# Patient Record
Sex: FEMALE | Race: WHITE | NOT HISPANIC OR LATINO | URBAN - METROPOLITAN AREA
[De-identification: names, ages, dates, MRNs, and addresses within clinical notes are randomized per-mention and may not be internally consistent; named-entity substitution may affect disease eponyms.]

---

## 2018-08-08 ENCOUNTER — EMERGENCY (EMERGENCY)
Facility: HOSPITAL | Age: 83
LOS: 1 days | Discharge: HOME | End: 2018-08-08
Attending: EMERGENCY MEDICINE
Payer: MEDICARE

## 2018-08-08 VITALS
RESPIRATION RATE: 18 BRPM | TEMPERATURE: 99 F | WEIGHT: 119.93 LBS | SYSTOLIC BLOOD PRESSURE: 164 MMHG | HEIGHT: 62 IN | HEART RATE: 68 BPM | DIASTOLIC BLOOD PRESSURE: 83 MMHG | OXYGEN SATURATION: 96 %

## 2018-08-08 LAB
APPEARANCE UR: CLEAR — SIGNIFICANT CHANGE UP
BACTERIA # UR AUTO: ABNORMAL
BILIRUB UR-MCNC: NEGATIVE — SIGNIFICANT CHANGE UP
COLOR SPEC: YELLOW — SIGNIFICANT CHANGE UP
DIFF PNL FLD: ABNORMAL
EPI CELLS # UR: SIGNIFICANT CHANGE UP
GLUCOSE UR QL: NEGATIVE MG/DL — SIGNIFICANT CHANGE UP
KETONES UR-MCNC: NEGATIVE — SIGNIFICANT CHANGE UP
LEUKOCYTE ESTERASE UR-ACNC: ABNORMAL
NITRITE UR-MCNC: NEGATIVE — SIGNIFICANT CHANGE UP
PH UR: 6 — SIGNIFICANT CHANGE UP (ref 5–8)
PROT UR-MCNC: NEGATIVE MG/DL — SIGNIFICANT CHANGE UP
RBC CASTS # UR COMP ASSIST: SIGNIFICANT CHANGE UP /HPF (ref 0–4)
SP GR SPEC: 1.01 — SIGNIFICANT CHANGE UP (ref 1.01–1.02)
UROBILINOGEN FLD QL: NEGATIVE MG/DL — SIGNIFICANT CHANGE UP
WBC UR QL: ABNORMAL

## 2018-08-08 PROCEDURE — 81001 URINALYSIS AUTO W/SCOPE: CPT

## 2018-08-08 PROCEDURE — 82962 GLUCOSE BLOOD TEST: CPT

## 2018-08-08 PROCEDURE — 99284 EMERGENCY DEPT VISIT MOD MDM: CPT | Mod: 25

## 2018-08-08 PROCEDURE — 99284 EMERGENCY DEPT VISIT MOD MDM: CPT

## 2018-08-08 PROCEDURE — 87086 URINE CULTURE/COLONY COUNT: CPT

## 2018-08-08 PROCEDURE — 96372 THER/PROPH/DIAG INJ SC/IM: CPT

## 2018-08-08 PROCEDURE — 70450 CT HEAD/BRAIN W/O DYE: CPT

## 2018-08-08 PROCEDURE — 70450 CT HEAD/BRAIN W/O DYE: CPT | Mod: 26

## 2018-08-08 PROCEDURE — 87186 SC STD MICRODIL/AGAR DIL: CPT

## 2018-08-08 RX ORDER — CEPHALEXIN 500 MG
1 CAPSULE ORAL
Qty: 28 | Refills: 0 | OUTPATIENT
Start: 2018-08-08 | End: 2018-08-14

## 2018-08-08 RX ORDER — CEFTRIAXONE 500 MG/1
250 INJECTION, POWDER, FOR SOLUTION INTRAMUSCULAR; INTRAVENOUS ONCE
Qty: 0 | Refills: 0 | Status: COMPLETED | OUTPATIENT
Start: 2018-08-08 | End: 2018-08-08

## 2018-08-08 RX ADMIN — CEFTRIAXONE 250 MILLIGRAM(S): 500 INJECTION, POWDER, FOR SOLUTION INTRAMUSCULAR; INTRAVENOUS at 21:36

## 2018-08-08 NOTE — ED STATDOCS - PROGRESS NOTE DETAILS
89yo female with pmh of advanced dementia woke up around 1130am this morning more confused than normal with difficulty speaking, last normal was last night prior to going to sleep. Pt was this confused for about 3 hours, now back to baseline. NIH score 0 pt does not know the date but this is baseline as per daughter. Priority CT ordered, pt to be further evaluated in the main.

## 2018-08-08 NOTE — ED ADULT NURSE NOTE - NSIMPLEMENTINTERV_GEN_ALL_ED
Implemented All Fall with Harm Risk Interventions:  [place] to call system. Call bell, personal items and telephone within reach. Instruct patient to call for assistance. Room bathroom lighting operational. Non-slip footwear when patient is off stretcher. Physically safe environment: no spills, clutter or unnecessary equipment. Stretcher in lowest position, wheels locked, appropriate side rails in place. Provide visual cue, wrist band, yellow gown, etc. Monitor gait and stability. Monitor for mental status changes and reorient to person, place, and time. Review medications for side effects contributing to fall risk. Reinforce activity limits and safety measures with patient and family. Provide visual clues: red socks.

## 2018-08-08 NOTE — ED ADULT NURSE NOTE - OBJECTIVE STATEMENT
Per family, pt had episode of difficulty speaking. Pt A&Ox1 which is baseline. Denies pain at this time.

## 2018-08-08 NOTE — ED ADULT TRIAGE NOTE - CHIEF COMPLAINT QUOTE
pt presents to ED With episode of more confusion than normal and unable to speak, which family states is sometimes normal for her at 1130/1200 lasting one hour now resolved. pt a&ox1 which is pt's baseline as per family. pt has hx of dementia. no facial droop noted. pt ambulates with assistance. pt has decreased PO intake today. denies HA/slurred speech. ED attending called to triage for immediate evaluation.

## 2018-08-08 NOTE — ED PROVIDER NOTE - NS_ ATTENDINGSCRIBEDETAILS _ED_A_ED_FT
I, Nahid Irvin, performed the initial face to face bedside interview with this patient regarding history of present illness, review of symptoms and relevant past medical, social and family history.  I completed an independent physical examination.   The history, relevant review of systems, past medical and surgical history, medical decision making, and physical examination was documented by the scribe in my presence and I attest to the accuracy of the documentation.

## 2018-08-08 NOTE — ED ADULT NURSE NOTE - EENT WDL
- likely type 2 in the setting of acute respiratory failure and acute CHF  - troponin was noted to be 0 13 at highest, then trended down  - telemetry monitoring and EKGs remained stable  - continue aspirin, statin, and beta blocker  Eyes with no visual disturbances.  Ears clean and dry and no hearing difficulties. Nose with pink mucosa and no drainage.  Mouth mucous membranes moist and pink.  No tenderness or swelling to throat or neck.

## 2018-08-08 NOTE — ED PROVIDER NOTE - OBJECTIVE STATEMENT
89 y/o F pt with hx of dementia presents to ED brought in with  and daughter for AMS, disoriented, agitation and refusing to eat. Per daughter pt had Episode of unable to speech and transient episode unable to see, unable to move. They called pt's primary who advised pt to come ED for eval for dehydration. No vomiting, fevers, diarrhea, dysuria. Hx limited from pt secondary to hx of dementia   PMD: Dr. Varner

## 2019-10-30 PROBLEM — Z00.00 ENCOUNTER FOR PREVENTIVE HEALTH EXAMINATION: Status: ACTIVE | Noted: 2019-10-30

## 2019-11-05 ENCOUNTER — APPOINTMENT (OUTPATIENT)
Dept: ORTHOPEDIC SURGERY | Facility: CLINIC | Age: 84
End: 2019-11-05

## 2019-12-04 ENCOUNTER — APPOINTMENT (OUTPATIENT)
Dept: ORTHOPEDIC SURGERY | Facility: CLINIC | Age: 84
End: 2019-12-04
Payer: MEDICARE

## 2019-12-04 VITALS
BODY MASS INDEX: 25.76 KG/M2 | HEART RATE: 78 BPM | WEIGHT: 140 LBS | HEIGHT: 62 IN | SYSTOLIC BLOOD PRESSURE: 116 MMHG | DIASTOLIC BLOOD PRESSURE: 72 MMHG

## 2019-12-04 DIAGNOSIS — Z78.9 OTHER SPECIFIED HEALTH STATUS: ICD-10-CM

## 2019-12-04 DIAGNOSIS — Z87.39 PERSONAL HISTORY OF OTHER DISEASES OF THE MUSCULOSKELETAL SYSTEM AND CONNECTIVE TISSUE: ICD-10-CM

## 2019-12-04 DIAGNOSIS — M16.11 UNILATERAL PRIMARY OSTEOARTHRITIS, RIGHT HIP: ICD-10-CM

## 2019-12-04 PROCEDURE — 73502 X-RAY EXAM HIP UNI 2-3 VIEWS: CPT | Mod: RT

## 2019-12-04 PROCEDURE — 99203 OFFICE O/P NEW LOW 30 MIN: CPT

## 2019-12-04 RX ORDER — QUETIAPINE FUMARATE 25 MG/1
25 TABLET ORAL
Refills: 0 | Status: ACTIVE | COMMUNITY

## 2019-12-04 RX ORDER — CHROMIUM 200 MCG
TABLET ORAL
Refills: 0 | Status: ACTIVE | COMMUNITY

## 2019-12-04 RX ORDER — LIDOCAINE 5% 700 MG/1
5 PATCH TOPICAL
Refills: 0 | Status: ACTIVE | COMMUNITY

## 2019-12-04 RX ORDER — LEVOTHYROXINE SODIUM 0.03 MG/1
25 TABLET ORAL
Refills: 0 | Status: ACTIVE | COMMUNITY

## 2019-12-04 NOTE — DISCUSSION/SUMMARY
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 The patient is in severe pain but the family does not want to entertain surgery at this point due to advanced dementia. We will send her for right hip US-guided intraarticular cortisone injection. \par \par Total hip arthroplasty: A hip replacement means a resurfacing of both surfaces of the hip, with metal, ceramic and/or plastic parts. The prosthetic parts are usually press fit into bone, and only rarely cemented into position. Patients are allowed to weight bear as tolerated in most cases. The postoperative motion is determined by multiple factors the most important of which is preoperative motion. In general, the better the motion pre operatively, the better the motion post operatively. The operation, pending medical clearance, generally requires a hospitalization of 3-4 days. In general, we prefer to perform the procedure under epidural or general anesthesia. Preoperatively we institute a nomogram for blood management which may include the administration of procrit. The operative procedure takes approximately 1-2 hours. The operation requires an oblique incision anywhere from 4-6 inches over the posterolateral hip region. Post operatively the patient is walking the day of or the day after surgery. The first couple of days are very painful and the pain medication will alleviate but not eliminate the pain. Thus the patient must really push hard to get their mobility back. Our goal for having the person go home is that they can walk with a walker or a cane. The walking aide is to be dispensed with once the patient is secure enough. In general, there is no cast or braces required with a routine hip replacement. In the long term, we do not encourage our patients to run for the sake of running; although, pending their pre operative status, we often allow patients to play doubles tennis or comparable activities. We also allow them to do gentle intermediate downhill skiing if they are truly an expert skier. Biking is encouraged as well as swimming. The follow up periods are usually at 3 weeks, 6 weeks, 3 months, and yearly intervals. Potential complications with total hip replacements include anaesthetic complications and death, infection (less than 1%), nerve damage, and in the case of a sciatic nerve injury, a permanent foot drop, (a flapping foot with ambulation that requires bracing). There are always areas of skin numbness but this is not an untoward effect nor do we consider it a complication. Other potential complications include dislocation of the hip component (less than 5%). In cases of recurrent dislocation revision surgery may be required. The loosening of either the acetabular or femoral component is much more infrequent. The components may wear, creating particulate debris, loosening and systemic complications. Specific concerns exist with all bearing surfaces such as metal sensitivity with metal on metal components, and the risk of fracture and squeaking with ceramic components. Major blood vessel damage is also extremely rare. Theoretically because of the anatomic proximity of the femoral artery, it could be lacerated with subsequent repairs required. Albeit unlikely, a disruption of the femoral artery could theoretically result in an amputation. Similarly, infection could theoretically result in an amputation if one were to grow out an organism that cannot be controlled with antibiotics. Most infections require removal of the prosthesis, placement of an antibiotic spacer, IV antibiotics for eradication, prior to reimplantation. General medical complications include phlebitis for which we prophylactically anticoagulate but it could still occur and fatal pulmonary embolus has been reported. Cardiovascular problems, such as a heart attack or ischemia are always a concern with such hemodynamic changes in the blood vascular system. There is a risk of leg length discrepancy and the need for a shoe lift. Other general complications are very rare but anything in medicine could theoretically happen. I think the patient understands the risk benefit ratio of total hip replacement and will think about whether they would like to pursue an operative or non-operative option.  I reviewed the plan of care as well as a model of a total hip implant equivalent to the one that will be used for their total hip joint replacement. The patient agreed to the plan of care as well as the use of implants for their hip total joint replacement.\par

## 2019-12-04 NOTE — PHYSICAL EXAM
[Normal] : Gait: normal [LE] : Sensory: Intact in bilateral lower extremities [ALL] : dorsalis pedis, posterior tibial, femoral, popliteal, and radial 2+ and symmetric bilaterally [Antalgic] : not antalgic [de-identified] : Right hip exam shows marked stiffness of right hip, pain with ROM. [de-identified] : GENERAL APPEARANCE: Well nourished and hydrated, pleasant, Appears their stated age. Severe advanced dementia.\par HEENT: Normocephalic, extraocular eye motion intact. Nasal septum midline. Oral cavity clear. External auditory canal clear. \par RESPIRATORY: Breath sounds clear and audible in all lobes. No wheezing, No accessory muscle use.\par CARDIOVASCULAR: No apparent abnormalities. No lower leg edema. No varicosities. Pedal pulses are palpable.\par NEUROLOGIC: Sensation is normal, no muscle weakness in the upper or lower extremities.\par DERMATOLOGIC: No apparent skin lesions, moist, warm, no rash.\par SPINE: Cervical spine appears normal and moves freely; thoracic spine appears normal and moves freely; lumbosacral spine appears normal and moves freely, normal, nontender.\par MUSCULOSKELETAL: Hands, wrists, and elbows are normal and move freely, shoulders are normal and move freely.  [de-identified] : 3V Xray of the right hip and pelvis done in office today and reviewed by Dr. Juan Tom demonstrates bone-on-bone right hip osteoarthritis.

## 2019-12-04 NOTE — HISTORY OF PRESENT ILLNESS
[6] : a current pain level of 6/10 [Stable] : stable [Bending] : worsened by bending [Standing] : standing [Walking] : worsened by walking [de-identified] : 89 year old female here for evaluation of right hip pain. Patient with daughter, patient with hx of dementia.  patient has had chronic pain for over 10 years, has had injections in past by [facility].  patient had injections, last being 2 years ago. patient uses OTC patches for pain. Daughter states pain increasing 6/10 in severity, patient in PT/OT at assisted living.  patient having more difficulty standing up from seated positions. Pain is worse with walking, standing, and bending motions as per family. patient family looking for pain relief\par

## 2019-12-04 NOTE — ADDENDUM
[FreeTextEntry1] : I, Damon Alejandre, acted solely as a scribe for Dr. Juan Tom on this date 12/04/2019.

## 2019-12-22 ENCOUNTER — FORM ENCOUNTER (OUTPATIENT)
Age: 84
End: 2019-12-22

## 2019-12-23 ENCOUNTER — OUTPATIENT (OUTPATIENT)
Dept: OUTPATIENT SERVICES | Facility: HOSPITAL | Age: 84
LOS: 1 days | End: 2019-12-23

## 2019-12-23 ENCOUNTER — APPOINTMENT (OUTPATIENT)
Dept: ULTRASOUND IMAGING | Facility: CLINIC | Age: 84
End: 2019-12-23
Payer: MEDICARE

## 2019-12-23 DIAGNOSIS — M16.11 UNILATERAL PRIMARY OSTEOARTHRITIS, RIGHT HIP: ICD-10-CM

## 2019-12-23 PROCEDURE — 20611 DRAIN/INJ JOINT/BURSA W/US: CPT | Mod: RT

## 2019-12-30 ENCOUNTER — EMERGENCY (EMERGENCY)
Facility: HOSPITAL | Age: 84
LOS: 1 days | Discharge: HOME | End: 2019-12-30
Attending: EMERGENCY MEDICINE
Payer: MEDICARE

## 2019-12-30 VITALS
HEIGHT: 63 IN | OXYGEN SATURATION: 98 % | RESPIRATION RATE: 14 BRPM | DIASTOLIC BLOOD PRESSURE: 68 MMHG | SYSTOLIC BLOOD PRESSURE: 100 MMHG | TEMPERATURE: 98 F | WEIGHT: 110.01 LBS | HEART RATE: 111 BPM

## 2019-12-30 LAB
ALBUMIN SERPL ELPH-MCNC: 3.9 G/DL — SIGNIFICANT CHANGE UP (ref 3.3–5.2)
ALP SERPL-CCNC: 78 U/L — SIGNIFICANT CHANGE UP (ref 40–120)
ALT FLD-CCNC: 11 U/L — SIGNIFICANT CHANGE UP
ANION GAP SERPL CALC-SCNC: 12 MMOL/L — SIGNIFICANT CHANGE UP (ref 5–17)
APPEARANCE UR: CLEAR — SIGNIFICANT CHANGE UP
AST SERPL-CCNC: 15 U/L — SIGNIFICANT CHANGE UP
BACTERIA # UR AUTO: ABNORMAL
BILIRUB SERPL-MCNC: 0.2 MG/DL — LOW (ref 0.4–2)
BILIRUB UR-MCNC: NEGATIVE — SIGNIFICANT CHANGE UP
BUN SERPL-MCNC: 23 MG/DL — HIGH (ref 8–20)
CALCIUM SERPL-MCNC: 9.1 MG/DL — SIGNIFICANT CHANGE UP (ref 8.6–10.2)
CHLORIDE SERPL-SCNC: 99 MMOL/L — SIGNIFICANT CHANGE UP (ref 98–107)
CO2 SERPL-SCNC: 26 MMOL/L — SIGNIFICANT CHANGE UP (ref 22–29)
COLOR SPEC: YELLOW — SIGNIFICANT CHANGE UP
CREAT SERPL-MCNC: 0.84 MG/DL — SIGNIFICANT CHANGE UP (ref 0.5–1.3)
DIFF PNL FLD: ABNORMAL
EPI CELLS # UR: SIGNIFICANT CHANGE UP
GLUCOSE SERPL-MCNC: 81 MG/DL — SIGNIFICANT CHANGE UP (ref 70–115)
GLUCOSE UR QL: NEGATIVE — SIGNIFICANT CHANGE UP
HCT VFR BLD CALC: 37.7 % — SIGNIFICANT CHANGE UP (ref 34.5–45)
HGB BLD-MCNC: 12.3 G/DL — SIGNIFICANT CHANGE UP (ref 11.5–15.5)
KETONES UR-MCNC: NEGATIVE — SIGNIFICANT CHANGE UP
LEUKOCYTE ESTERASE UR-ACNC: NEGATIVE — SIGNIFICANT CHANGE UP
MAGNESIUM SERPL-MCNC: 2.2 MG/DL — SIGNIFICANT CHANGE UP (ref 1.6–2.6)
MCHC RBC-ENTMCNC: 30.4 PG — SIGNIFICANT CHANGE UP (ref 27–34)
MCHC RBC-ENTMCNC: 32.6 GM/DL — SIGNIFICANT CHANGE UP (ref 32–36)
MCV RBC AUTO: 93.1 FL — SIGNIFICANT CHANGE UP (ref 80–100)
NITRITE UR-MCNC: POSITIVE
PH UR: 6.5 — SIGNIFICANT CHANGE UP (ref 5–8)
PLATELET # BLD AUTO: 308 K/UL — SIGNIFICANT CHANGE UP (ref 150–400)
POTASSIUM SERPL-MCNC: 4.6 MMOL/L — SIGNIFICANT CHANGE UP (ref 3.5–5.3)
POTASSIUM SERPL-SCNC: 4.6 MMOL/L — SIGNIFICANT CHANGE UP (ref 3.5–5.3)
PROT SERPL-MCNC: 7.3 G/DL — SIGNIFICANT CHANGE UP (ref 6.6–8.7)
PROT UR-MCNC: NEGATIVE — SIGNIFICANT CHANGE UP
RBC # BLD: 4.05 M/UL — SIGNIFICANT CHANGE UP (ref 3.8–5.2)
RBC # FLD: 13.2 % — SIGNIFICANT CHANGE UP (ref 10.3–14.5)
RBC CASTS # UR COMP ASSIST: SIGNIFICANT CHANGE UP /HPF (ref 0–4)
SODIUM SERPL-SCNC: 137 MMOL/L — SIGNIFICANT CHANGE UP (ref 135–145)
SP GR SPEC: 1.01 — SIGNIFICANT CHANGE UP (ref 1.01–1.02)
UROBILINOGEN FLD QL: NEGATIVE — SIGNIFICANT CHANGE UP
WBC # BLD: 7.88 K/UL — SIGNIFICANT CHANGE UP (ref 3.8–10.5)
WBC # FLD AUTO: 7.88 K/UL — SIGNIFICANT CHANGE UP (ref 3.8–10.5)
WBC UR QL: SIGNIFICANT CHANGE UP

## 2019-12-30 PROCEDURE — 82962 GLUCOSE BLOOD TEST: CPT

## 2019-12-30 PROCEDURE — 93010 ELECTROCARDIOGRAM REPORT: CPT

## 2019-12-30 PROCEDURE — 99284 EMERGENCY DEPT VISIT MOD MDM: CPT | Mod: GC

## 2019-12-30 PROCEDURE — 36415 COLL VENOUS BLD VENIPUNCTURE: CPT

## 2019-12-30 PROCEDURE — 85027 COMPLETE CBC AUTOMATED: CPT

## 2019-12-30 PROCEDURE — 74019 RADEX ABDOMEN 2 VIEWS: CPT | Mod: 26

## 2019-12-30 PROCEDURE — 93005 ELECTROCARDIOGRAM TRACING: CPT

## 2019-12-30 PROCEDURE — 99284 EMERGENCY DEPT VISIT MOD MDM: CPT | Mod: 25

## 2019-12-30 PROCEDURE — 84484 ASSAY OF TROPONIN QUANT: CPT

## 2019-12-30 PROCEDURE — 70450 CT HEAD/BRAIN W/O DYE: CPT | Mod: 26

## 2019-12-30 PROCEDURE — 83735 ASSAY OF MAGNESIUM: CPT

## 2019-12-30 PROCEDURE — 74019 RADEX ABDOMEN 2 VIEWS: CPT

## 2019-12-30 PROCEDURE — 71046 X-RAY EXAM CHEST 2 VIEWS: CPT | Mod: 26

## 2019-12-30 PROCEDURE — 83690 ASSAY OF LIPASE: CPT

## 2019-12-30 PROCEDURE — 87086 URINE CULTURE/COLONY COUNT: CPT

## 2019-12-30 PROCEDURE — 81001 URINALYSIS AUTO W/SCOPE: CPT

## 2019-12-30 PROCEDURE — 87186 SC STD MICRODIL/AGAR DIL: CPT

## 2019-12-30 PROCEDURE — 71046 X-RAY EXAM CHEST 2 VIEWS: CPT

## 2019-12-30 PROCEDURE — 70450 CT HEAD/BRAIN W/O DYE: CPT

## 2019-12-30 PROCEDURE — 80053 COMPREHEN METABOLIC PANEL: CPT

## 2019-12-30 PROCEDURE — 96374 THER/PROPH/DIAG INJ IV PUSH: CPT

## 2019-12-30 RX ORDER — CEPHALEXIN 500 MG
1 CAPSULE ORAL
Qty: 14 | Refills: 0
Start: 2019-12-30 | End: 2020-01-05

## 2019-12-30 RX ORDER — CEPHALEXIN 500 MG
500 CAPSULE ORAL EVERY 12 HOURS
Refills: 0 | Status: DISCONTINUED | OUTPATIENT
Start: 2019-12-30 | End: 2019-12-30

## 2019-12-30 RX ORDER — MIDAZOLAM HYDROCHLORIDE 1 MG/ML
2 INJECTION, SOLUTION INTRAMUSCULAR; INTRAVENOUS ONCE
Refills: 0 | Status: DISCONTINUED | OUTPATIENT
Start: 2019-12-30 | End: 2019-12-30

## 2019-12-30 RX ORDER — CEPHALEXIN 500 MG
500 CAPSULE ORAL ONCE
Refills: 0 | Status: COMPLETED | OUTPATIENT
Start: 2019-12-30 | End: 2019-12-30

## 2019-12-30 RX ADMIN — MIDAZOLAM HYDROCHLORIDE 2 MILLIGRAM(S): 1 INJECTION, SOLUTION INTRAMUSCULAR; INTRAVENOUS at 21:59

## 2019-12-30 RX ADMIN — Medication 500 MILLIGRAM(S): at 20:15

## 2019-12-30 NOTE — ED PROVIDER NOTE - ABDOMINAL EXAM
mild tenderness in lower quadrants. soft, nonrigid, nonperitonitic, no rebound tenderness, no guarding/tender.../soft

## 2019-12-30 NOTE — ED ADULT NURSE NOTE - OBJECTIVE STATEMENT
Patient awake, alert, confused, baseline as per EMS. Patient has Hx of severe dementia, sent from [facility]. As per EMS patient was sitting at table & became lethargic per staff at facility. Patient not answering questions appropriately, again, baseline. BGL: 112.

## 2019-12-30 NOTE — ED ADULT NURSE NOTE - CHIEF COMPLAINT QUOTE
Patient awake, alert, confused, baseline as per EMS. Patient has Hx of severe dementia, sent from [facility]. As per EMS patient was sitting at table & became lethargic per staff at facility. Patient not answering questions appropriately, again, baseline. BGL: 112.

## 2019-12-30 NOTE — ED PROVIDER NOTE - NSFOLLOWUPINSTRUCTIONS_ED_ALL_ED_FT
- Follow up with your doctor within 2-3 days.   - Bring results with you to the appointment.   - Take Keflex 500mg twice a day for 7 days.   - Return to the ED for any new or worsening symptoms.     Urinary Tract Infection    A urinary tract infection (UTI) is an infection of any part of the urinary tract, which includes the kidneys, ureters, bladder, and urethra. Risk factors include ignoring your need to urinate, wiping back to front if female, being an uncircumcised male, and having diabetes or a weak immune system. Symptoms include frequent urination, pain or burning with urination, foul smelling urine, cloudy urine, pain in the lower abdomen, blood in the urine, and fever. If you were prescribed an antibiotic medicine, take it as told by your health care provider. Do not stop taking the antibiotic even if you start to feel better.    SEEK IMMEDIATE MEDICAL CARE IF YOU HAVE ANY OF THE FOLLOWING SYMPTOMS: severe back or abdominal pain, fever, inability to keep fluids or medicine down, dizziness/lightheadedness, or a change in mental status.

## 2019-12-30 NOTE — ED PROVIDER NOTE - PATIENT PORTAL LINK FT
You can access the FollowMyHealth Patient Portal offered by [facility] by registering at the following website: [email or web address]. By joining [facility]’s FollowMyHealth portal, you will also be able to view your health information using other applications (apps) compatible with our system.

## 2019-12-30 NOTE — ED PROVIDER NOTE - ATTENDING CONTRIBUTION TO CARE
I personally saw the patient with the resident, and completed the key components of the history and physical exam. I then discussed the management plan with the resident.     Elderly female, NAD; smiling; pleasantly demented, unable to contribute any reliable history; on exam, non toxic, no icterus, normal mucosa; frail; normal lung sounds, no resp distress; normal peripheral pulses; no pedal edema; abd soft nt nd    Labs imaging ECG reviewed; UTI noted; awaiting head CT, signed over to Dr Chin pending results

## 2019-12-30 NOTE — ED PROVIDER NOTE - CLINICAL SUMMARY MEDICAL DECISION MAKING FREE TEXT BOX
Pt is a 89 y.o. F with dementia and hypothyroidism presenting after being unresponsive earlier today. Unable to obtain further history, physical exam with mild tenderness of lower quadrants without any other findings. Labs, EKG, plain films, will follow up.

## 2019-12-30 NOTE — ED PROVIDER NOTE - OBJECTIVE STATEMENT
Pt is a 89 y.o. F hx of dementia and hypothyroidism presenting with lethargy from an assisted living facility. The pt was at the a table with staff at [facility] [place] when she was unresponsive for 6 to 8 minutes, eyes rolled back, with pallor and diaphoresis. Afterward the pt began speaking incoherently and then returned to baseline, which is pleasantly demented. Unable to illicit any further history due to dementia state.

## 2019-12-30 NOTE — ED ADULT TRIAGE NOTE - CHIEF COMPLAINT QUOTE
Patient awake, alert, confused, baseline as per EMS. Patient has Hx of severe dementia, sent from [facility]. As per EMS patient was sitting at table & became lethargic per staff at facility. Patient not answering questions appropriately, again, baseline. BGL: 112.

## 2019-12-30 NOTE — ED ADULT NURSE NOTE - NSIMPLEMENTINTERV_GEN_ALL_ED
Implemented All Fall with Harm Risk Interventions:  [place] to call system. Call bell, personal items and telephone within reach. Instruct patient to call for assistance. Room bathroom lighting operational. Non-slip footwear when patient is off stretcher. Physically safe environment: no spills, clutter or unnecessary equipment. Stretcher in lowest position, wheels locked, appropriate side rails in place. Provide visual cue, wrist band, yellow gown, etc. Monitor gait and stability. Monitor for mental status changes and reorient to person, place, and time. Review medications for side effects contributing to fall risk. Reinforce activity limits and safety measures with patient and family. Provide visual clues: red socks.

## 2019-12-31 VITALS
DIASTOLIC BLOOD PRESSURE: 82 MMHG | HEART RATE: 60 BPM | SYSTOLIC BLOOD PRESSURE: 159 MMHG | RESPIRATION RATE: 18 BRPM | TEMPERATURE: 98 F | OXYGEN SATURATION: 98 %

## 2020-05-12 ENCOUNTER — EMERGENCY (EMERGENCY)
Facility: HOSPITAL | Age: 85
LOS: 1 days | Discharge: HOME | End: 2020-05-12
Attending: EMERGENCY MEDICINE
Payer: MEDICARE

## 2020-05-12 VITALS
OXYGEN SATURATION: 99 % | TEMPERATURE: 98 F | HEART RATE: 90 BPM | RESPIRATION RATE: 14 BRPM | SYSTOLIC BLOOD PRESSURE: 152 MMHG | DIASTOLIC BLOOD PRESSURE: 87 MMHG

## 2020-05-12 PROBLEM — F39 UNSPECIFIED MOOD [AFFECTIVE] DISORDER: Chronic | Status: ACTIVE | Noted: 2019-12-30

## 2020-05-12 PROBLEM — F03.90 UNSPECIFIED DEMENTIA WITHOUT BEHAVIORAL DISTURBANCE: Chronic | Status: ACTIVE | Noted: 2019-12-30

## 2020-05-12 PROBLEM — E03.9 HYPOTHYROIDISM, UNSPECIFIED: Chronic | Status: ACTIVE | Noted: 2019-12-30

## 2020-05-12 LAB
ALBUMIN SERPL ELPH-MCNC: 3.2 G/DL — LOW (ref 3.3–5.2)
ALP SERPL-CCNC: 71 U/L — SIGNIFICANT CHANGE UP (ref 40–120)
ALT FLD-CCNC: 23 U/L — SIGNIFICANT CHANGE UP
ANION GAP SERPL CALC-SCNC: 12 MMOL/L — SIGNIFICANT CHANGE UP (ref 5–17)
APPEARANCE UR: ABNORMAL
AST SERPL-CCNC: 34 U/L — HIGH
BASOPHILS # BLD AUTO: 0.03 K/UL — SIGNIFICANT CHANGE UP (ref 0–0.2)
BASOPHILS NFR BLD AUTO: 0.4 % — SIGNIFICANT CHANGE UP (ref 0–2)
BILIRUB SERPL-MCNC: 0.5 MG/DL — SIGNIFICANT CHANGE UP (ref 0.4–2)
BILIRUB UR-MCNC: NEGATIVE — SIGNIFICANT CHANGE UP
BUN SERPL-MCNC: 23 MG/DL — HIGH (ref 8–20)
CALCIUM SERPL-MCNC: 9.1 MG/DL — SIGNIFICANT CHANGE UP (ref 8.6–10.2)
CHLORIDE SERPL-SCNC: 103 MMOL/L — SIGNIFICANT CHANGE UP (ref 98–107)
CO2 SERPL-SCNC: 26 MMOL/L — SIGNIFICANT CHANGE UP (ref 22–29)
COLOR SPEC: YELLOW — SIGNIFICANT CHANGE UP
CREAT SERPL-MCNC: 0.69 MG/DL — SIGNIFICANT CHANGE UP (ref 0.5–1.3)
DIFF PNL FLD: ABNORMAL
EOSINOPHIL # BLD AUTO: 0.02 K/UL — SIGNIFICANT CHANGE UP (ref 0–0.5)
EOSINOPHIL NFR BLD AUTO: 0.3 % — SIGNIFICANT CHANGE UP (ref 0–6)
GLUCOSE SERPL-MCNC: 92 MG/DL — SIGNIFICANT CHANGE UP (ref 70–99)
GLUCOSE UR QL: NEGATIVE — SIGNIFICANT CHANGE UP
HCT VFR BLD CALC: 38.1 % — SIGNIFICANT CHANGE UP (ref 34.5–45)
HGB BLD-MCNC: 12.1 G/DL — SIGNIFICANT CHANGE UP (ref 11.5–15.5)
IMM GRANULOCYTES NFR BLD AUTO: 0.3 % — SIGNIFICANT CHANGE UP (ref 0–1.5)
KETONES UR-MCNC: ABNORMAL
LEUKOCYTE ESTERASE UR-ACNC: ABNORMAL
LYMPHOCYTES # BLD AUTO: 1.11 K/UL — SIGNIFICANT CHANGE UP (ref 1–3.3)
LYMPHOCYTES # BLD AUTO: 15.8 % — SIGNIFICANT CHANGE UP (ref 13–44)
MCHC RBC-ENTMCNC: 28.9 PG — SIGNIFICANT CHANGE UP (ref 27–34)
MCHC RBC-ENTMCNC: 31.8 GM/DL — LOW (ref 32–36)
MCV RBC AUTO: 90.9 FL — SIGNIFICANT CHANGE UP (ref 80–100)
MONOCYTES # BLD AUTO: 0.62 K/UL — SIGNIFICANT CHANGE UP (ref 0–0.9)
MONOCYTES NFR BLD AUTO: 8.8 % — SIGNIFICANT CHANGE UP (ref 2–14)
NEUTROPHILS # BLD AUTO: 5.22 K/UL — SIGNIFICANT CHANGE UP (ref 1.8–7.4)
NEUTROPHILS NFR BLD AUTO: 74.4 % — SIGNIFICANT CHANGE UP (ref 43–77)
NITRITE UR-MCNC: POSITIVE
PH UR: 6 — SIGNIFICANT CHANGE UP (ref 5–8)
PLATELET # BLD AUTO: 338 K/UL — SIGNIFICANT CHANGE UP (ref 150–400)
POTASSIUM SERPL-MCNC: 4.7 MMOL/L — SIGNIFICANT CHANGE UP (ref 3.5–5.3)
POTASSIUM SERPL-SCNC: 4.7 MMOL/L — SIGNIFICANT CHANGE UP (ref 3.5–5.3)
PROT SERPL-MCNC: 7.5 G/DL — SIGNIFICANT CHANGE UP (ref 6.6–8.7)
PROT UR-MCNC: 30 MG/DL
RBC # BLD: 4.19 M/UL — SIGNIFICANT CHANGE UP (ref 3.8–5.2)
RBC # FLD: 12.9 % — SIGNIFICANT CHANGE UP (ref 10.3–14.5)
SODIUM SERPL-SCNC: 141 MMOL/L — SIGNIFICANT CHANGE UP (ref 135–145)
SP GR SPEC: 1.01 — SIGNIFICANT CHANGE UP (ref 1.01–1.02)
UROBILINOGEN FLD QL: 1
WBC # BLD: 7.02 K/UL — SIGNIFICANT CHANGE UP (ref 3.8–10.5)
WBC # FLD AUTO: 7.02 K/UL — SIGNIFICANT CHANGE UP (ref 3.8–10.5)

## 2020-05-12 PROCEDURE — 99220: CPT | Mod: CS

## 2020-05-12 PROCEDURE — 71045 X-RAY EXAM CHEST 1 VIEW: CPT | Mod: 26

## 2020-05-12 RX ORDER — AMLODIPINE BESYLATE 2.5 MG/1
5 TABLET ORAL ONCE
Refills: 0 | Status: COMPLETED | OUTPATIENT
Start: 2020-05-12 | End: 2020-05-12

## 2020-05-12 RX ORDER — CEFTRIAXONE 500 MG/1
1000 INJECTION, POWDER, FOR SOLUTION INTRAMUSCULAR; INTRAVENOUS EVERY 24 HOURS
Refills: 0 | Status: DISCONTINUED | OUTPATIENT
Start: 2020-05-13 | End: 2020-05-17

## 2020-05-12 RX ORDER — SODIUM CHLORIDE 9 MG/ML
1000 INJECTION INTRAMUSCULAR; INTRAVENOUS; SUBCUTANEOUS ONCE
Refills: 0 | Status: COMPLETED | OUTPATIENT
Start: 2020-05-12 | End: 2020-05-12

## 2020-05-12 RX ORDER — QUETIAPINE FUMARATE 200 MG/1
25 TABLET, FILM COATED ORAL DAILY
Refills: 0 | Status: DISCONTINUED | OUTPATIENT
Start: 2020-05-12 | End: 2020-05-17

## 2020-05-12 RX ORDER — LEVOTHYROXINE SODIUM 125 MCG
25 TABLET ORAL DAILY
Refills: 0 | Status: DISCONTINUED | OUTPATIENT
Start: 2020-05-12 | End: 2020-05-17

## 2020-05-12 RX ORDER — CEFTRIAXONE 500 MG/1
1000 INJECTION, POWDER, FOR SOLUTION INTRAMUSCULAR; INTRAVENOUS ONCE
Refills: 0 | Status: COMPLETED | OUTPATIENT
Start: 2020-05-12 | End: 2020-05-12

## 2020-05-12 RX ADMIN — SODIUM CHLORIDE 1000 MILLILITER(S): 9 INJECTION INTRAMUSCULAR; INTRAVENOUS; SUBCUTANEOUS at 15:18

## 2020-05-12 RX ADMIN — AMLODIPINE BESYLATE 5 MILLIGRAM(S): 2.5 TABLET ORAL at 20:16

## 2020-05-12 RX ADMIN — CEFTRIAXONE 100 MILLIGRAM(S): 500 INJECTION, POWDER, FOR SOLUTION INTRAMUSCULAR; INTRAVENOUS at 14:41

## 2020-05-12 RX ADMIN — CEFTRIAXONE 1000 MILLIGRAM(S): 500 INJECTION, POWDER, FOR SOLUTION INTRAMUSCULAR; INTRAVENOUS at 14:58

## 2020-05-12 NOTE — ED CDU PROVIDER INITIAL DAY NOTE - OBJECTIVE STATEMENT
89 yo female with hx dementia, hypothyroid, sent from [facility] for evaluation. As per paperwork, patient was sob earlier, and was sent to "r/o covid".  Patient is awake, cooperative, but confused  oriented to person and denies any complaints

## 2020-05-12 NOTE — ED ADULT NURSE NOTE - CHIEF COMPLAINT QUOTE
Patient alert to self, confused to time, place, situation. Comes from [facility] facility to R/O [facility]. Transfer paperwork states "SpO2 90%, SOB, generalized weakness, difficulty ambulating, increased lethargy & facial grimacing." Does not state onset of complaints. Patient SpO2 99% room air during triage. Patient unable to follow commands.

## 2020-05-12 NOTE — ED ADULT TRIAGE NOTE - CHIEF COMPLAINT QUOTE
Patient alert to self, confused to time, place, situation. Comes from [facility] facility to R/O [facility]. Transfer paperwork states "SpO2 90%, SOB, generalized weakness, difficulty ambulating, increased lethargy & facial grimacing." Does not state onset of complaints. Patient SpO2 99% room air during triage. Patient unable to follow commands.

## 2020-05-12 NOTE — ED PROVIDER NOTE - CLINICAL SUMMARY MEDICAL DECISION MAKING FREE TEXT BOX
patient with dementia, sent from [facility] for r/o covid  patient afebrile, spo2 99%  awake, alert, but confused  no focal neuro deficits

## 2020-05-12 NOTE — ED CDU PROVIDER INITIAL DAY NOTE - MEDICAL DECISION MAKING DETAILS
89 yo female ( A&O x 0 ) presented from [facility] to r/o covid for an episode of SOB. Pt found to have +UA and started on ceftriaxone. Pt placed in CDU pending COVID results

## 2020-05-12 NOTE — ED CDU PROVIDER INITIAL DAY NOTE - DETAILS
89 yo female ( A&O x 0 ) presented from [facility] to r/o covid for an episode of SOB. Pt found to have +UA and started on ceftriaxone. Pt placed in CDU pending COVID results

## 2020-05-12 NOTE — ED PROVIDER NOTE - OBJECTIVE STATEMENT
89 yo female with hx dementia, hypothyroid, sent from [facility] for evaluation. As per paperwork, patient was sob earlier, and was sent to "r/o covid".  Patient is awake, cooperative, but confused  oriented to person and denies any complaints

## 2020-05-12 NOTE — ED PROVIDER NOTE - PROGRESS NOTE DETAILS
I called Emily multiple times and left message    -MD Yoshi I spoke with RN at [facility] who states patient is 'not herself', not eating, and there are many patients at [facility] with covid  will check labs, cxr  spoke with SW who will follow case case d/w Zuleika at [facility]; patient cannot return without 2 negative covid swabs  will admit to obs  d/w Dr. Dubois d/w Zuleika at [facility] who requests covid swab prior to patient returning    patient to be admitted for OBS pending covid swab  d/w Dr. Dubois

## 2020-05-12 NOTE — CHART NOTE - NSCHARTNOTEFT_GEN_A_CORE
CHARLOTTE Note: SW alerted that pt is r/o Covid and from [facility]. Covid swab results are pending at this time. CHARLOTTE placed call to Emily Walden, message left for wellness. CHARLOTTE continues to follow

## 2020-05-12 NOTE — ED CDU PROVIDER INITIAL DAY NOTE - PROGRESS NOTE DETAILS
Reviewed patient's paperwork from the [facility]- patient has physician-signed order for DNR. Reviewed living will- patient does not wish to have any extraordinary life-sustaining medical treatment to prolong life. DNR order placed. Radha Levi DO Patient in NAD. Elevated BP, Norvasc 5mg ordered. Pending COVID results to be placed back at [facility]. Spoke with daughter Michelle. Updated. Aware of plan. Requesting liquid Keflex for DC.

## 2020-05-12 NOTE — ED PROVIDER NOTE - CARE PLAN
Principal Discharge DX:	Urinary tract infection  Secondary Diagnosis:	Weakness  Secondary Diagnosis:	Dehydration

## 2020-05-13 VITALS
RESPIRATION RATE: 18 BRPM | DIASTOLIC BLOOD PRESSURE: 73 MMHG | SYSTOLIC BLOOD PRESSURE: 136 MMHG | HEART RATE: 100 BPM | OXYGEN SATURATION: 97 %

## 2020-05-13 LAB — SARS-COV-2 RNA SPEC QL NAA+PROBE: SIGNIFICANT CHANGE UP

## 2020-05-13 PROCEDURE — 72192 CT PELVIS W/O DYE: CPT

## 2020-05-13 PROCEDURE — 73552 X-RAY EXAM OF FEMUR 2/>: CPT

## 2020-05-13 PROCEDURE — 87086 URINE CULTURE/COLONY COUNT: CPT

## 2020-05-13 PROCEDURE — U0003: CPT

## 2020-05-13 PROCEDURE — 87186 SC STD MICRODIL/AGAR DIL: CPT

## 2020-05-13 PROCEDURE — 99284 EMERGENCY DEPT VISIT MOD MDM: CPT | Mod: 25

## 2020-05-13 PROCEDURE — 76377 3D RENDER W/INTRP POSTPROCES: CPT

## 2020-05-13 PROCEDURE — 96365 THER/PROPH/DIAG IV INF INIT: CPT

## 2020-05-13 PROCEDURE — 82728 ASSAY OF FERRITIN: CPT

## 2020-05-13 PROCEDURE — 80053 COMPREHEN METABOLIC PANEL: CPT

## 2020-05-13 PROCEDURE — 73552 X-RAY EXAM OF FEMUR 2/>: CPT | Mod: 26,RT

## 2020-05-13 PROCEDURE — 71045 X-RAY EXAM CHEST 1 VIEW: CPT

## 2020-05-13 PROCEDURE — 85027 COMPLETE CBC AUTOMATED: CPT

## 2020-05-13 PROCEDURE — 86140 C-REACTIVE PROTEIN: CPT

## 2020-05-13 PROCEDURE — 99217: CPT | Mod: CS

## 2020-05-13 PROCEDURE — 81001 URINALYSIS AUTO W/SCOPE: CPT

## 2020-05-13 PROCEDURE — G0378: CPT

## 2020-05-13 PROCEDURE — 36415 COLL VENOUS BLD VENIPUNCTURE: CPT

## 2020-05-13 PROCEDURE — 72192 CT PELVIS W/O DYE: CPT | Mod: 26

## 2020-05-13 PROCEDURE — 76377 3D RENDER W/INTRP POSTPROCES: CPT | Mod: 26

## 2020-05-13 RX ORDER — CEPHALEXIN 500 MG
10 CAPSULE ORAL
Qty: 280 | Refills: 0
Start: 2020-05-13 | End: 2020-05-19

## 2020-05-13 RX ADMIN — Medication 25 MICROGRAM(S): at 06:51

## 2020-05-13 RX ADMIN — QUETIAPINE FUMARATE 25 MILLIGRAM(S): 200 TABLET, FILM COATED ORAL at 12:14

## 2020-05-13 NOTE — ED ADULT NURSE REASSESSMENT NOTE - NS ED NURSE REASSESS COMMENT FT1
Assumed pt. care at 1930 from off going RN. PT. A&Ox 0. Pt. has hx of dementia. Pt. answers simple questions but has a flight of ideas. Pt. respirations  even and unlabored. Pt. denies any pain at  this time. Pt. hypertensive at this time. DOMI Batres made aware and to order norvasc. RN to recheck pressure after medication/.
Pt resting comfortably. Denies any pain. Offered dinner, ate with nursing assistant helping her. IVF infusing. Awaiting covid results.
Pt. mental status unchanged. Pt. respirations even and unlabored. Pt. moving around in stretcher, readjusted multiple times.
Pt. respirations even and unlabored. Pt. resting comfortably in stretcher. Pt. awaiting covid swab.
Report received from Nancy KIRK. Assumed pt care @0730. Pt received A&Ox0, awake and alert, impulsive talking. No apparent distress noted @ this time. VSS. Pt independently moves extremities. HR is 85 bpm NSR , lung sounds are clear b/l, abd is soft and nontender with positive bowel sounds in all four quadrants, skin is warm, dry and appropriate for age and race. Pt resting comfortably in stretcher. Pt fed breakfast. Pt safety maintained.
Assumed care fo the patient at 0900. Verbal report received from Hillary RN/Yessica RN ED. Patient transported to CDU 8 via stretcher. Patient Alert and confused. No s/s of distress. Patient noted with ecchymotic area and mild pain to Posterior Right hip, Jean Mack notified, no further orders received. Patient pending CM for placement. Patient to receive Rocephin IV as per orders at 2pm. Patient incontinent, cain care provided. T&P. Call bell within reach and encouraged to use when assistance needed. No further questions for the RN. Will continue to monitor.

## 2020-05-13 NOTE — ED CDU PROVIDER DISPOSITION NOTE - CLINICAL COURSE
89 y/o female (A&Ox0 ) presented from [facility] to r/o COVID for an episode of SOB. Patient found to have +UA and started on ceftriaxone. Patient placed in CDU pending negative COVID results to return to [facility]. Prescription for liquid cephalexin needed. COVID swab is negative. Pt with some bruising to right hip from old fall, CT / XR negative for acute fracture. SW arranged for dispo back to [place]. Follow up outpatient with PMD and specialists.

## 2020-05-13 NOTE — ED CDU PROVIDER DISPOSITION NOTE - NSFOLLOWUPINSTRUCTIONS_ED_ALL_ED_FT
- Please follow up with your Primary Care Doctor in 24-48 hr.  - Seek immediate medical care for any new, worsening or concerning signs or symptoms.   - Take medications as directed, be sure to read all instructions on packaging, Be sure to complete entire course of antibiotics as directed   - You were given copies of all your test results, please bring to your primary care doctor for review  - Follow up with Orthopedist and gynecologist    Feel better!    Urinary Tract Infection    A urinary tract infection (UTI) is an infection of any part of the urinary tract, which includes the kidneys, ureters, bladder, and urethra. Risk factors include ignoring your need to urinate, wiping back to front if female, being an uncircumcised male, and having diabetes or a weak immune system. Symptoms include frequent urination, pain or burning with urination, foul smelling urine, cloudy urine, pain in the lower abdomen, blood in the urine, and fever. If you were prescribed an antibiotic medicine, take it as told by your health care provider. Do not stop taking the antibiotic even if you start to feel better.    SEEK IMMEDIATE MEDICAL CARE IF YOU HAVE ANY OF THE FOLLOWING SYMPTOMS: severe back or abdominal pain, fever, inability to keep fluids or medicine down, dizziness/lightheadedness, or a change in mental status.

## 2020-05-13 NOTE — CHART NOTE - NSCHARTNOTEFT_GEN_A_CORE
SOCIAL WORK NOTE:  [facility]  THEY MADE THIS WORKER AWARE THEY NEEDED 2 NEGATIVES IN ORDER TO ACCEPT THE PATIENT BACK.  MADE THEM AWARE CASE ALREADY DISCUSSED WITH MANAGEMENT AND AS PER LATEST STATE EXECUTIVE ORDER, ONLY 1 NEGATIVE RESULT WAS NEEDED FOR RETURN TO ASSISTED LIVING.  THEY REPORTED THEY NEEDED TO REACH OUT TO  REGARDING SITUATION AND GET BACK TO THIS WORKER.

## 2020-05-13 NOTE — ED CDU PROVIDER DISPOSITION NOTE - NSFOLLOWUPCLINICS_GEN_ALL_ED_FT
[facility] [place]  Neurosurgery/Spine  [address], [address]  [place] [address]  Phone: [phone]  Fax:   Follow Up Time: 7-10 Days

## 2020-05-13 NOTE — ED ADULT NURSE REASSESSMENT NOTE - COMFORT CARE
plan of care explained/po fluids offered/meal provided/side rails up/repositioned/wait time explained

## 2020-05-13 NOTE — ED CDU PROVIDER SUBSEQUENT DAY NOTE - HISTORY
Elevated BP yesterday evening. Norvasc 5mg administered, stabilized. Vital signs remained stable overnight. Received no calls by RN overnight.

## 2020-05-13 NOTE — ED CDU PROVIDER DISPOSITION NOTE - PATIENT PORTAL LINK FT
You can access the FollowMyHealth Patient Portal offered by [facility] by registering at the following website: [email or web address]. By joining [employer]’s FollowMyHealth portal, you will also be able to view your health information using other applications (apps) compatible with our system.

## 2020-05-13 NOTE — ED CDU PROVIDER SUBSEQUENT DAY NOTE - ATTENDING CONTRIBUTION TO CARE
I, Nahid Irvin, performed a face to face bedside interview with this patient regarding history of present illness, review of symptoms and relevant past medical, social and family history.  I completed an independent physical examination. I have communicated the patient’s plan of care and disposition with the ACP.        pt awaiting COVID testing and clearance to return to nursing home; pt noted with elevated bp  tx with antihypertensive; pe  awake alert in nad; heent ncat neck supple cor s1 s2 lungs clear abd soft nontender neuro nonfocal  dx dementia;  followup with covid test and case management for final disposition

## 2020-05-13 NOTE — ED CDU PROVIDER DISPOSITION NOTE - CARE PROVIDER_API CALL
Danny Lake (MD)  Orthopaedic Surgery  [address]  [place] [address]  Phone: [phone]  Fax: [phone]  Follow Up Time: 7-10 Days

## 2020-05-13 NOTE — ED CDU PROVIDER SUBSEQUENT DAY NOTE - MEDICAL DECISION MAKING DETAILS
89 yo female (A&Ox0 ) presented from [facility] to r/o COVID for an episode of SOB. Patient found to have +UA and started on ceftriaxone. Patient placed in CDU pending negative COVID results to return to [place]. Prescription for liquid cephalexin needed.

## 2020-05-13 NOTE — ED CDU PROVIDER SUBSEQUENT DAY NOTE - PROGRESS NOTE DETAILS
Spoke with Rajni KIRK @ [facility] in [place]. At this time, states not accepting patient back as need second negative COVID. At 0730 will be escalated at the [facility] and will call [facility] to speak with incoming PA. Social work aware of situation with 2nd COVID at [facility] and will speak to [facility]. Called by RN that she noted an ecchymotic area and mild pain to Posterior Right hip. Pt evaluated at bedside with Dr. Irvin, pt awake pleasant confused, not c/o pain. On exam noted old appearing ecchymotic area to posterior right hip/thigh with mild ttp. CT and XR ordered. [facility] accepted pt. SW arranged for dispo back to [facility] facility, via ambulance.  CT/XR negative for acute fx. Reviewed all results and plan with Dr. Irvin. Spoke to daughter Michelle on telephone, Discussion includes results, plan, and return precautions, daughter states she has had several mechanical falls at facility.  Pt sent to facility with printed copies of lab/radiology for outpt follow up.

## 2020-05-15 ENCOUNTER — EMERGENCY (EMERGENCY)
Facility: HOSPITAL | Age: 85
LOS: 1 days | Discharge: HOME | End: 2020-05-15
Attending: STUDENT IN AN ORGANIZED HEALTH CARE EDUCATION/TRAINING PROGRAM
Payer: MEDICARE

## 2020-05-15 VITALS — WEIGHT: 100.09 LBS | HEIGHT: 60 IN

## 2020-05-15 VITALS
OXYGEN SATURATION: 96 % | SYSTOLIC BLOOD PRESSURE: 125 MMHG | TEMPERATURE: 98 F | HEART RATE: 88 BPM | DIASTOLIC BLOOD PRESSURE: 72 MMHG | RESPIRATION RATE: 19 BRPM

## 2020-05-15 LAB
ALBUMIN SERPL ELPH-MCNC: 3.2 G/DL — LOW (ref 3.3–5.2)
ALP SERPL-CCNC: 66 U/L — SIGNIFICANT CHANGE UP (ref 40–120)
ALT FLD-CCNC: 17 U/L — SIGNIFICANT CHANGE UP
ANION GAP SERPL CALC-SCNC: 14 MMOL/L — SIGNIFICANT CHANGE UP (ref 5–17)
AST SERPL-CCNC: 19 U/L — SIGNIFICANT CHANGE UP
BASOPHILS # BLD AUTO: 0.02 K/UL — SIGNIFICANT CHANGE UP (ref 0–0.2)
BASOPHILS NFR BLD AUTO: 0.4 % — SIGNIFICANT CHANGE UP (ref 0–2)
BILIRUB SERPL-MCNC: 0.4 MG/DL — SIGNIFICANT CHANGE UP (ref 0.4–2)
BUN SERPL-MCNC: 19 MG/DL — SIGNIFICANT CHANGE UP (ref 8–20)
CALCIUM SERPL-MCNC: 8.9 MG/DL — SIGNIFICANT CHANGE UP (ref 8.6–10.2)
CHLORIDE SERPL-SCNC: 107 MMOL/L — SIGNIFICANT CHANGE UP (ref 98–107)
CO2 SERPL-SCNC: 26 MMOL/L — SIGNIFICANT CHANGE UP (ref 22–29)
CREAT SERPL-MCNC: 0.64 MG/DL — SIGNIFICANT CHANGE UP (ref 0.5–1.3)
EOSINOPHIL # BLD AUTO: 0.05 K/UL — SIGNIFICANT CHANGE UP (ref 0–0.5)
EOSINOPHIL NFR BLD AUTO: 0.9 % — SIGNIFICANT CHANGE UP (ref 0–6)
GLUCOSE SERPL-MCNC: 91 MG/DL — SIGNIFICANT CHANGE UP (ref 70–99)
HCT VFR BLD CALC: 35.5 % — SIGNIFICANT CHANGE UP (ref 34.5–45)
HGB BLD-MCNC: 11.3 G/DL — LOW (ref 11.5–15.5)
IMM GRANULOCYTES NFR BLD AUTO: 0.5 % — SIGNIFICANT CHANGE UP (ref 0–1.5)
LYMPHOCYTES # BLD AUTO: 1.22 K/UL — SIGNIFICANT CHANGE UP (ref 1–3.3)
LYMPHOCYTES # BLD AUTO: 22.3 % — SIGNIFICANT CHANGE UP (ref 13–44)
MCHC RBC-ENTMCNC: 29 PG — SIGNIFICANT CHANGE UP (ref 27–34)
MCHC RBC-ENTMCNC: 31.8 GM/DL — LOW (ref 32–36)
MCV RBC AUTO: 91.3 FL — SIGNIFICANT CHANGE UP (ref 80–100)
MONOCYTES # BLD AUTO: 0.43 K/UL — SIGNIFICANT CHANGE UP (ref 0–0.9)
MONOCYTES NFR BLD AUTO: 7.9 % — SIGNIFICANT CHANGE UP (ref 2–14)
NEUTROPHILS # BLD AUTO: 3.72 K/UL — SIGNIFICANT CHANGE UP (ref 1.8–7.4)
NEUTROPHILS NFR BLD AUTO: 68 % — SIGNIFICANT CHANGE UP (ref 43–77)
PLATELET # BLD AUTO: 332 K/UL — SIGNIFICANT CHANGE UP (ref 150–400)
POTASSIUM SERPL-MCNC: 3.6 MMOL/L — SIGNIFICANT CHANGE UP (ref 3.5–5.3)
POTASSIUM SERPL-SCNC: 3.6 MMOL/L — SIGNIFICANT CHANGE UP (ref 3.5–5.3)
PROT SERPL-MCNC: 6.9 G/DL — SIGNIFICANT CHANGE UP (ref 6.6–8.7)
RBC # BLD: 3.89 M/UL — SIGNIFICANT CHANGE UP (ref 3.8–5.2)
RBC # FLD: 12.8 % — SIGNIFICANT CHANGE UP (ref 10.3–14.5)
SODIUM SERPL-SCNC: 147 MMOL/L — HIGH (ref 135–145)
WBC # BLD: 5.47 K/UL — SIGNIFICANT CHANGE UP (ref 3.8–10.5)
WBC # FLD AUTO: 5.47 K/UL — SIGNIFICANT CHANGE UP (ref 3.8–10.5)

## 2020-05-15 PROCEDURE — 36415 COLL VENOUS BLD VENIPUNCTURE: CPT

## 2020-05-15 PROCEDURE — 99283 EMERGENCY DEPT VISIT LOW MDM: CPT

## 2020-05-15 PROCEDURE — 99284 EMERGENCY DEPT VISIT MOD MDM: CPT

## 2020-05-15 PROCEDURE — 85027 COMPLETE CBC AUTOMATED: CPT

## 2020-05-15 PROCEDURE — 80053 COMPREHEN METABOLIC PANEL: CPT

## 2020-05-15 RX ORDER — FOSFOMYCIN TROMETHAMINE 3 G/1
3 POWDER ORAL ONCE
Refills: 0 | Status: COMPLETED | OUTPATIENT
Start: 2020-05-15 | End: 2020-05-15

## 2020-05-15 RX ADMIN — FOSFOMYCIN TROMETHAMINE 3 GRAM(S): 3 POWDER ORAL at 12:53

## 2020-05-15 NOTE — ED POST DISCHARGE NOTE - DETAILS
Results discussed with [facility]. Advised that the patient needs to return to ED for abx therapy.

## 2020-05-15 NOTE — ED PROVIDER NOTE - OBJECTIVE STATEMENT
Pt is a 89 yo F sent from [facility] for ESBL e coli in urine.  Pt unable to give any hx 2/2 dementia. Pt was sent here for eval 2 d ago and was discharged after COVID negative.  ESBL e coli found in urine.

## 2020-05-15 NOTE — CHART NOTE - NSCHARTNOTEFT_GEN_A_CORE
SOCIAL WORK NOTE:  THIS WORKER RECEIVED NOTIFICATION THAT PATIENT HERE FROM THE [facility].  PATIENT KNOWN TO THIS WORKER AND WORKED ON RECENT DC BACK TO THE [facility].  ED CALLED THE [facility] AS CULTURES RESULTED IN ESBL ECOLI IN THE URINE.  DISCUSSED CASE WITH MD AND NURSE.  AWAITING ID CONSULT FOR FURTHER DIRECTION REGARDING THE DIAGNOSIS.

## 2020-05-15 NOTE — ED ADULT NURSE NOTE - NSIMPLEMENTINTERV_GEN_ALL_ED
Implemented All Fall with Harm Risk Interventions:  [place] to call system. Call bell, personal items and telephone within reach. Instruct patient to call for assistance. Room bathroom lighting operational. Non-slip footwear when patient is off stretcher. Physically safe environment: no spills, clutter or unnecessary equipment. Stretcher in lowest position, wheels locked, appropriate side rails in place. Provide visual cue, wrist band, yellow gown, etc. Monitor gait and stability. Monitor for mental status changes and reorient to person, place, and time. Review medications for side effects contributing to fall risk. Reinforce activity limits and safety measures with patient and family. Provide visual clues: red socks.

## 2020-05-15 NOTE — ED ADULT NURSE NOTE - OBJECTIVE STATEMENT
Pt biba from [facility], pt recieevd alert and awake, A&OX0, pt is continuously talking but unable to answer any questions.  No signs of pain noted.  As per paperwork pt was sent for IV antibiotics for UTI.  Clear bsb, abd soft nondistended, nontender, moving all ext well.  VSS

## 2020-05-15 NOTE — ED PROVIDER NOTE - PHYSICAL EXAMINATION
Constitutional - well-developed; well nourished. Head - NCAT. Airway patent. Eyes - PERRL. CV - RRR. no murmur. no edema. Pulm - CTAB. Abd - soft, nt. no rebound. no guarding. Neuro - Alert. SEGURA. Skin - No rash. MSK - normal ROM.

## 2020-05-15 NOTE — ED PROVIDER NOTE - PATIENT PORTAL LINK FT
You can access the FollowMyHealth Patient Portal offered by [facility] by registering at the following website: [email or web address]. By joining [employer]’s FollowMyHealth portal, you will also be able to view your health information using other applications (apps) compatible with our system.

## 2020-05-15 NOTE — ED PROVIDER NOTE - NSFOLLOWUPINSTRUCTIONS_ED_ALL_ED_FT
PLEASE GIVE two more doses of fosfomycin 3 grams.  First dose on Monday May 18th and second dose on Thursday May 21st.

## 2020-05-15 NOTE — CONSULT NOTE ADULT - ASSESSMENT
89y  Female with h/o Dementia. Patient was sent to the ED for a medical evaluation. COVID 19 was ruled out in the ED. Urine culture came back with ESBL E coli.       Urine culture reporting ESBL E coli   Dementia      - COVID 19 PCR is negative   - Urine culture reporting ESBL E coli   - UA not suggestive of UTI  - Plan to treat with Fosfomycin 3gm PO   - Trend Fever  - Trend Leukocytosis      Will sign off. Please call PRN.

## 2020-05-15 NOTE — CHART NOTE - NSCHARTNOTEFT_GEN_A_CORE
SOCIAL WORK NOTE:  THIS WORKER CALLED AND SPOKE WITH THE [facility] WELLNESS TEAM # [phone].  MADE THEM AWARE THAT PATIENT WILL BE DISCHARGED FROM THE ED AND THAT SOCIAL WORK NOTE:  THIS WORKER CALLED AND SPOKE WITH THE [facility] WELLNESS TEAM # [phone].  MADE THEM AWARE THAT PATIENT WILL BE DISCHARGED FROM THE ED AND THAT PATIENT HAS A RX FOR FOSFOMYACIN SENT TO [facility].  THEY CONFIRMED THEY CAN HANDLE THAT PO MED AND NEEDED DC PAPERS AND [facility] ATTESTATION FORM FAXED.  SAME COMPLETED AND FAXED TO FAX # [phone] AND AMBULANCE ARRANGED FOR 2:00 PM WHICH THEY ARE AWARE OF .  DR LIEBERMAN REPORTED HE CALLED PATIENT'S DAUGHTER FOR UPDATE AND MADE HER AWARE REGARDING PATIENT'S RETURN, METHOD AND TIME OF RETURN.

## 2020-05-15 NOTE — ED PROVIDER NOTE - CLINICAL SUMMARY MEDICAL DECISION MAKING FREE TEXT BOX
labs reviewed.  Case d/w DR. Gamboa who recommends 3 g of fosfomycin every 3 days for 3 doses.  CW spoke with the [facility].  will d/c back to assisted living.  Case d/w patient's daughter in [place].

## 2020-05-15 NOTE — CONSULT NOTE ADULT - SUBJECTIVE AND OBJECTIVE BOX
[facility]  INFECTIOUS DISEASES AND INTERNAL MEDICINE at [place]  =======================================================  Jonathan Mendieta MD  Diplomates American Board of Internal Medicine and Infectious Diseases  Tel: [phone]      Fax: [phone]  =======================================================      [id number]  KATIE NICE    History obtained from the chart. Patient unable to provide history.     CC: Patient is a 89y old  Female who presents with a chief complaint of medical evaluation    89y  Female with h/o Dementia. Patient was sent to the ED for a medical evaluation. COVID 19 was ruled out in the ED. Urine culture came back with ESBL E coli. ID input requested.       Past Medical & Surgical Hx:  Mood disorder  Hypothyroidism  Dementia  No significant past surgical history      Social Hx:  Unable to obtain due to medical condition       FAMILY HISTORY:  Unable to obtain due to medical condition       Allergies  sulfa drugs (Unknown)       REVIEW OF SYSTEMS:  Unable to obtain due to medical condition       Physical Exam:  GEN: no meaningful communication   HEENT: normocephalic and atraumatic.  Anicteric  NECK: Supple.   LUNGS: Clear to auscultation.  HEART: Regular rate and rhythm   ABDOMEN: Soft, nontender, and nondistended.  Positive bowel sounds.    : No CVA tenderness  EXTREMITIES: Without any edema.  MSK: No joint swelling  NEUROLOGIC: Unable to obtain due to medical condition   PSYCHIATRIC: Unable to obtain due to medical condition   SKIN: No Rash      Height (cm): 152.4 (05-15 @ 09:24)  Weight (kg): 45.4 (05-15 @ 09:24)  BMI (kg/m2): 19.5 (05-15 @ 09:24)  BSA (m2): 1.39 (05-15 @ 09:24)      Vitals:    T(F): 97.9 (15 May 2020 09:26), Max: 97.9 (15 May 2020 09:26)  HR: 88 (15 May 2020 09:26)  BP: 125/72 (15 May 2020 09:26)  RR: 19 (15 May 2020 09:26)  SpO2: 96% (15 May 2020 09:26) (96% - 96%)  temp max in last 48H T(F): , Max: 97.9 (05-15-20 @ 09:26)      Current Antibiotics:  fosfomycin 3 Gram(s) Oral Once    Other medications:        Labs:             11.3   5.47  )-----------( 332      ( 15 May 2020 10:20 )             35.5      05-15    147<H>  |  107  |  19.0  ----------------------------<  91  3.6   |  26.0  |  0.64    Ca    8.9      15 May 2020 10:20    TPro  6.9  /  Alb  3.2<L>  /  TBili  0.4  /  DBili  x   /  AST  19  /  ALT  17  /  AlkPhos  66  05-15      Culture - Urine (collected 05-12-20 @ 22:10)  Source: .Urine Clean Catch (Midstream)  Final Report (05-14-20 @ 18:00):    >100,000 CFU/ml Escherichia coli ESBL  Organism: Escherichia coli ESBL (05-14-20 @ 18:00)  Organism: Escherichia coli ESBL (05-14-20 @ 18:00)    Sensitivities:      -  Amikacin: S <=16      -  Ampicillin: R >16 These ampicillin results predict results for amoxicillin      -  Ampicillin/Sulbactam: R <=8/4 Enterobacter, Citrobacter, and Serratia may develop resistance during prolonged therapy (3-4 days)      -  Aztreonam: R >16      -  Cefazolin: R >16 (MIC_CL_COM_ENTERIC_CEFAZU) For uncomplicated UTI with K. pneumoniae, E. coli, or P. mirablis: ADITHYA <=16 is sensitive and ADITHYA >=32 is resistant. This also predicts results for oral agents cefaclor, cefdinir, cefpodoxime, cefprozil, cefuroxime axetil, cephalexin and locarbef for uncomplicated UTI. Note that some isolates may be susceptible to these agents while testing resistant to cefazolin.      -  Cefepime: R >16      -  Cefoxitin: S <=8      -  Ceftriaxone: R >32 Enterobacter, Citrobacter, and Serratia may develop resistance during prolonged therapy      -  Ciprofloxacin: R >2      -  Ertapenem: S <=1      -  Gentamicin: S <=4      -  Imipenem: S <=1      -  Levofloxacin: R >4      -  Meropenem: S <=1      -  Nitrofurantoin: R >64 Should not be used to treat pyelonephritis      -  Piperacillin/Tazobactam: R <=16      -  Tigecycline: S <=2      -  Tobramycin: S <=4      -  Trimethoprim/Sulfamethoxazole: R >2/38      Method Type: ADITHYA      WBC Count: 5.47 K/uL (05-15-20 @ 10:20)  WBC Count: 7.02 K/uL (05-12-20 @ 14:11)    Creatinine, Serum: 0.64 mg/dL (05-15-20 @ 10:20)  Creatinine, Serum: 0.69 mg/dL (05-12-20 @ 14:11)    C-Reactive Protein, Serum: 10.24 mg/dL (05-12-20 @ 14:11)    Ferritin, Serum: 518 ng/mL (05-12-20 @ 14:11)    COVID-19 PCR: NotDetec (05-12-20 @ 14:49)        < from: Xray Femur 2 Views, Right (05.13.20 @ 11:24) >  EXAM:  XR FEMUR 2 VIEWS RT                          PROCEDURE DATE:  05/13/2020      INTERPRETATION:  Indication: Bruising.    Technique: 4 views of the right femur were obtained.    Findings: No fracture is seen. No osseous lesion is seen. There is severe osteoarthrosis of the right hip. The soft tissues appear grossly unremarkable.    Impression: No fracture of the right femur.    < end of copied text >          < from: CT Pelvis Bony Only No Cont (05.13.20 @ 10:43) >  EXAM:  CT PELVIS BONY ONLY                        [facility]                          PROCEDURE DATE:  05/13/2020      INTERPRETATION:  CT PELVIS BONY, CT 3D RECONSTRUCTION W WORKSTATION dated 5/13/2020 10:43 AM     INDICATION: Right hip bruising    COMPARISON: Right hip ultrasound steroid injection dated 10/23/2019.    TECHNIQUE: CT imaging of the pelvis was performed. The data was reformatted in the axial, coronal, and sagittal planes. Additionally, 3-D reformatted imaging was created at a separate workstation.    FINDINGS:    OSSEOUS STRUCTURES: There is severe right hip joint space narrowing with subchondral cystic changes and sclerosis. There is mild narrowing the left hip joint space with mild marginal spurring. Pubic symphysis demonstrates minimal spurring. Spurring and sclerosis of both lower sacroiliac joints. Severe degenerative disc disease at L5-S1. There is a 0.4 cm bone island within the right iliac bone. No displaced fracture is seen thoughmildly limited by decreased bone mineralization.  SYNOVIUM/ JOINT FLUID: Large right hip joint effusion. No left hip joint effusion.  TENDONS: Mild enthesopathy at the proximal hamstring tendons. The remaining tendons appear intact.  MUSCLES: Normal muscle bulk. No intramuscular hematoma.  NEUROVASCULAR STRUCTURES: Moderate vascular calcifications within the infrarenal abdominal aorta.  INTRAPELVIC SOFT TISSUES: There is a cystic structure within the right pelvis measuring 2.4 cm. Small umbilicalfat-containing hernia.  SUBCUTANEOUS SOFT TISSUES: Bilateral calcified and noncalcified granulomas likely from prior injection.    3-D reformatted imaging confirms these findings.    IMPRESSION:    1.  No fracture given limitation of decreased bone mineralization.  2.  Severe right hip osteoarthritis.  3.  Moderate to severe degenerative change of the lower lumbar spine.  4.  Cystic lesion the right adnexal for which correlation with pelvic sonogram is recommended.    < end of copied text >

## 2020-05-15 NOTE — ED ADULT TRIAGE NOTE - CHIEF COMPLAINT QUOTE
Pt sent from [facility] for having +ESBL in urine and "told to come back for abx therapy", pt at baseline mental status, appears in NAD at this time, denies pain

## 2020-05-18 RX ORDER — FOSFOMYCIN TROMETHAMINE 3 G/1
3 POWDER ORAL
Qty: 6 | Refills: 0
Start: 2020-05-18

## 2021-12-31 NOTE — ED ADULT NURSE NOTE - CHIEF COMPLAINT QUOTE
Pt sent from [facility] for having +ESBL in urine and "told to come back for abx therapy", pt at baseline mental status, appears in NAD at this time, denies pain
31-Dec-2021 04:01

## 2022-04-22 NOTE — ED CDU PROVIDER DISPOSITION NOTE - ATTENDING CONTRIBUTION TO CARE
I, Nahid Irvin, performed a face to face bedside interview with this patient regarding history of present illness, review of symptoms and relevant past medical, social and family history.  I completed an independent physical examination. I have communicated the patient’s plan of care and disposition with the ACP.        pt covid negative with uti; will dc on abx ; close follow up pcp; No

## 2023-03-07 NOTE — ED CDU PROVIDER DISPOSITION NOTE - CONDITION AT DISCHARGE:
Satisfactory Muscle Hinge Flap Text: The defect edges were debeveled with a #15 scalpel blade.  Given the size, depth and location of the defect and the proximity to free margins a muscle hinge flap was deemed most appropriate. Using a sterile surgical marker, an appropriate hinge flap was drawn incorporating the defect. The area thus outlined was incised with a #15 scalpel blade. The skin margins were undermined to an appropriate distance in all directions utilizing iris scissors. Following this, the designed flap was carried into the primary defect and sutured into place.

## 2024-07-27 NOTE — ED ADULT NURSE NOTE - ISOLATION TYPE:
[Examination Of The Breasts] : a normal appearance [No Masses] : no breast masses were palpable [Labia Majora] : normal [Labia Minora] : normal [Normal] : normal [Uterine Adnexae] : normal None